# Patient Record
Sex: FEMALE | Race: ASIAN | URBAN - METROPOLITAN AREA
[De-identification: names, ages, dates, MRNs, and addresses within clinical notes are randomized per-mention and may not be internally consistent; named-entity substitution may affect disease eponyms.]

---

## 2019-04-03 ENCOUNTER — TELEPHONE (OUTPATIENT)
Dept: ENDOCRINOLOGY CLINIC | Facility: CLINIC | Age: 44
End: 2019-04-03

## 2019-04-05 ENCOUNTER — OFFICE VISIT (OUTPATIENT)
Dept: ENDOCRINOLOGY CLINIC | Facility: CLINIC | Age: 44
End: 2019-04-05
Payer: COMMERCIAL

## 2019-04-05 VITALS
HEIGHT: 63 IN | WEIGHT: 129.19 LBS | SYSTOLIC BLOOD PRESSURE: 118 MMHG | HEART RATE: 62 BPM | DIASTOLIC BLOOD PRESSURE: 77 MMHG | BODY MASS INDEX: 22.89 KG/M2

## 2019-04-05 DIAGNOSIS — Z86.39 HISTORY OF HYPERTHYROIDISM: Primary | ICD-10-CM

## 2019-04-05 DIAGNOSIS — R22.1 NECK FULLNESS: ICD-10-CM

## 2019-04-05 PROBLEM — E05.90 HYPERTHYROIDISM: Status: ACTIVE | Noted: 2019-04-05

## 2019-04-05 PROCEDURE — 99243 OFF/OP CNSLTJ NEW/EST LOW 30: CPT | Performed by: INTERNAL MEDICINE

## 2019-04-05 RX ORDER — ATORVASTATIN CALCIUM 10 MG/1
10 TABLET, FILM COATED ORAL DAILY
Refills: 5 | COMMUNITY
Start: 2019-02-26

## 2019-04-05 RX ORDER — PROPRANOLOL HYDROCHLORIDE 10 MG/1
10 TABLET ORAL 2 TIMES DAILY
Refills: 0 | COMMUNITY
Start: 2019-03-01

## 2019-04-05 NOTE — H&P
New Patient Evaluation - History and Physical    CONSULT - Reason for Visit:  Hyperthyroidism  Requesting Physician: Dr. João Matute:  Patient presents with:  Consult: Hyperthyroidism, hospitalized 1 year ago at [facility] for HTN change, fever, fatigue, cold/heat intolerance  Eyes: Negative for:  Visual changes, proptosis, blurring  ENT: Negative for:  dysphagia, neck swelling, dysphonia, + mild neck fullness  Respiratory: Negative for:  dyspnea, cough  Cardiovascular: Negative for importance of FU  She will repeat TSH in there months and fax over the results. Discussed symptoms of hyperthyroidism. I also discussed that given normal thyroid function and normal RI, I do not think that her palpitations are related to her thyroid.

## 2019-05-17 ENCOUNTER — TELEPHONE (OUTPATIENT)
Dept: ENDOCRINOLOGY CLINIC | Facility: CLINIC | Age: 44
End: 2019-05-17

## 2019-05-17 NOTE — TELEPHONE ENCOUNTER
Reviewed thyroid labs Given for scanning  Thyroid labs normal  She is s/p MMI for hyperthyroidism and has been off Rx for many months now. Since labs are normal, she is in remission from hyperthyroidism.    FU with PCP with annual labs  Should call if she

## 2019-05-20 NOTE — TELEPHONE ENCOUNTER
Spoke w/ Vitaly and discussed result note from Dr. Wilmer Harden. She verbalizes understanding and is agreeable to f/u with PCP for annual labs. Discussed s/sx of hyperthyroidism and to let us know if symptoms present.

## (undated) NOTE — LETTER
5/7/2019              Adina Spangler         Dear Tara Ryena records indicate that the THYROID ULTRASOUND ordered for you by Katy Tsang MD  have not been done.   If you have, in fact, zelda